# Patient Record
Sex: FEMALE | Race: WHITE | NOT HISPANIC OR LATINO
[De-identification: names, ages, dates, MRNs, and addresses within clinical notes are randomized per-mention and may not be internally consistent; named-entity substitution may affect disease eponyms.]

---

## 2017-07-12 ENCOUNTER — APPOINTMENT (OUTPATIENT)
Dept: ORTHOPEDIC SURGERY | Facility: CLINIC | Age: 52
End: 2017-07-12

## 2017-07-12 VITALS
BODY MASS INDEX: 27.6 KG/M2 | SYSTOLIC BLOOD PRESSURE: 154 MMHG | DIASTOLIC BLOOD PRESSURE: 89 MMHG | WEIGHT: 150 LBS | HEART RATE: 85 BPM | HEIGHT: 62 IN

## 2017-07-12 RX ORDER — AMOXICILLIN AND CLAVULANATE POTASSIUM 875; 125 MG/1; MG/1
875-125 TABLET, COATED ORAL
Qty: 14 | Refills: 0 | Status: ACTIVE | COMMUNITY
Start: 2017-02-08

## 2017-07-12 RX ORDER — BLOOD SUGAR DIAGNOSTIC
STRIP MISCELLANEOUS
Qty: 100 | Refills: 0 | Status: ACTIVE | COMMUNITY
Start: 2016-12-16

## 2017-07-12 RX ORDER — INSULIN GLARGINE 100 [IU]/ML
100 INJECTION, SOLUTION SUBCUTANEOUS
Qty: 15 | Refills: 0 | Status: ACTIVE | COMMUNITY
Start: 2016-09-20

## 2017-07-12 RX ORDER — LANCETS 33 GAUGE
EACH MISCELLANEOUS
Qty: 100 | Refills: 0 | Status: ACTIVE | COMMUNITY
Start: 2017-02-02

## 2017-07-12 RX ORDER — PEN NEEDLE, DIABETIC 29 G X1/2"
31G X 5 MM NEEDLE, DISPOSABLE MISCELLANEOUS
Qty: 100 | Refills: 0 | Status: ACTIVE | COMMUNITY
Start: 2016-09-21

## 2017-07-19 ENCOUNTER — OUTPATIENT (OUTPATIENT)
Dept: OUTPATIENT SERVICES | Facility: HOSPITAL | Age: 52
LOS: 1 days | End: 2017-07-19
Payer: COMMERCIAL

## 2017-07-19 VITALS
TEMPERATURE: 98 F | HEIGHT: 61 IN | HEART RATE: 82 BPM | DIASTOLIC BLOOD PRESSURE: 86 MMHG | SYSTOLIC BLOOD PRESSURE: 119 MMHG | WEIGHT: 163.14 LBS | RESPIRATION RATE: 14 BRPM | OXYGEN SATURATION: 99 %

## 2017-07-19 DIAGNOSIS — Z98.891 HISTORY OF UTERINE SCAR FROM PREVIOUS SURGERY: Chronic | ICD-10-CM

## 2017-07-19 DIAGNOSIS — M89.9 DISORDER OF BONE, UNSPECIFIED: Chronic | ICD-10-CM

## 2017-07-19 DIAGNOSIS — Z01.818 ENCOUNTER FOR OTHER PREPROCEDURAL EXAMINATION: ICD-10-CM

## 2017-07-19 DIAGNOSIS — E11.9 TYPE 2 DIABETES MELLITUS WITHOUT COMPLICATIONS: ICD-10-CM

## 2017-07-19 DIAGNOSIS — Z90.89 ACQUIRED ABSENCE OF OTHER ORGANS: Chronic | ICD-10-CM

## 2017-07-19 DIAGNOSIS — N64.9 DISORDER OF BREAST, UNSPECIFIED: Chronic | ICD-10-CM

## 2017-07-19 DIAGNOSIS — M65.312 TRIGGER THUMB, LEFT THUMB: ICD-10-CM

## 2017-07-19 DIAGNOSIS — Z98.890 OTHER SPECIFIED POSTPROCEDURAL STATES: Chronic | ICD-10-CM

## 2017-07-19 LAB
HBA1C BLD-MCNC: 7.9 % — HIGH (ref 4–5.6)
HCT VFR BLD CALC: 38.9 % — SIGNIFICANT CHANGE UP (ref 34.5–45)
HGB BLD-MCNC: 12.6 G/DL — SIGNIFICANT CHANGE UP (ref 11.5–15.5)
MCHC RBC-ENTMCNC: 29.9 PG — SIGNIFICANT CHANGE UP (ref 27–34)
MCHC RBC-ENTMCNC: 32.4 GM/DL — SIGNIFICANT CHANGE UP (ref 32–36)
MCV RBC AUTO: 92.2 FL — SIGNIFICANT CHANGE UP (ref 80–100)
PLATELET # BLD AUTO: 356 K/UL — SIGNIFICANT CHANGE UP (ref 150–400)
RBC # BLD: 4.22 M/UL — SIGNIFICANT CHANGE UP (ref 3.8–5.2)
RBC # FLD: 13.1 % — SIGNIFICANT CHANGE UP (ref 10.3–14.5)
WBC # BLD: 10.79 K/UL — HIGH (ref 3.8–10.5)
WBC # FLD AUTO: 10.79 K/UL — HIGH (ref 3.8–10.5)

## 2017-07-19 PROCEDURE — 85027 COMPLETE CBC AUTOMATED: CPT

## 2017-07-19 PROCEDURE — G0463: CPT

## 2017-07-19 PROCEDURE — 80048 BASIC METABOLIC PNL TOTAL CA: CPT

## 2017-07-19 PROCEDURE — 83036 HEMOGLOBIN GLYCOSYLATED A1C: CPT

## 2017-07-19 RX ORDER — CELECOXIB 200 MG/1
200 CAPSULE ORAL ONCE
Qty: 0 | Refills: 0 | Status: COMPLETED | OUTPATIENT
Start: 2017-07-24 | End: 2017-07-24

## 2017-07-19 RX ORDER — SODIUM CHLORIDE 9 MG/ML
3 INJECTION INTRAMUSCULAR; INTRAVENOUS; SUBCUTANEOUS EVERY 8 HOURS
Qty: 0 | Refills: 0 | Status: DISCONTINUED | OUTPATIENT
Start: 2017-07-24 | End: 2017-08-08

## 2017-07-19 RX ORDER — LIDOCAINE HCL 20 MG/ML
0.2 VIAL (ML) INJECTION ONCE
Qty: 0 | Refills: 0 | Status: DISCONTINUED | OUTPATIENT
Start: 2017-07-24 | End: 2017-08-08

## 2017-07-19 RX ORDER — ACETAMINOPHEN 500 MG
975 TABLET ORAL ONCE
Qty: 0 | Refills: 0 | Status: COMPLETED | OUTPATIENT
Start: 2017-07-24 | End: 2017-07-24

## 2017-07-19 NOTE — H&P PST ADULT - PROBLEM SELECTOR PLAN 2
hold metformin on DOS  pt was instructed by PMD to hold lantus 50% the night prior to surgery.  check finger stick DOS

## 2017-07-19 NOTE — H&P PST ADULT - PSH
Breast lesion  s/p right breast lumpectomy, benign  H/O foot surgery    History of tonsillectomy    S/P  section  x2  Skull lesion  s/p excision of cyst on skull - benign

## 2017-07-19 NOTE — H&P PST ADULT - MUSCULOSKELETAL
details… detailed exam left thumb with decreased ROM, 50 degrees flexion and extension at interphalangeal joint (PIP)

## 2017-07-19 NOTE — H&P PST ADULT - PMH
Dyslipidemia    Gestational diabetes    Preeclampsia    Type 2 diabetes mellitus Dyslipidemia    Gestational diabetes    Hypertension    Preeclampsia    Trigger thumb, left thumb    Type 2 diabetes mellitus

## 2017-07-19 NOTE — H&P PST ADULT - NSANTHOSAYNRD_GEN_A_CORE
No. SUSU screening performed.  STOP BANG Legend: 0-2 = LOW Risk; 3-4 = INTERMEDIATE Risk; 5-8 = HIGH Risk

## 2017-07-19 NOTE — H&P PST ADULT - HISTORY OF PRESENT ILLNESS
53 yo female. h/o T2DM (FS='s), HTN, HLD. c/o left thumb pain, locking and decreased ROM x months, evaluated by ortho, s/p JOEL and NSAIDS treatments, Dr. Hernández, now presents to PST scheduled for trigger thumb release.

## 2017-07-19 NOTE — H&P PST ADULT - ATTENDING COMMENTS
The patient has trigger finger left thumb which is locked in mid flexion/extension. Patient is markedly limited by the pain and restricted movement. Additional cortisone injection is unlikely to provide lasting relief. Therefore left trigger release surgery is indicated.    Surgery for left trigger finger is indicated because of symptoms refractory to conservative treatment. The patient has pain and interference with activities of daily living. While the patient may see improvement, the patient may not have complete range of motion or complete return to activities of daily living. Postoperative hand therapy may be required. The range of treatment alternatives, and the associated risks complications limitations and expectations were explained and discussed. All questions have been answered. The patient has expressed acceptance and understanding of the above and has consented to surgery.

## 2017-07-20 LAB
ANION GAP SERPL CALC-SCNC: 20 MMOL/L — HIGH (ref 5–17)
BUN SERPL-MCNC: 19 MG/DL — SIGNIFICANT CHANGE UP (ref 7–23)
CALCIUM SERPL-MCNC: 9.6 MG/DL — SIGNIFICANT CHANGE UP (ref 8.4–10.5)
CHLORIDE SERPL-SCNC: 102 MMOL/L — SIGNIFICANT CHANGE UP (ref 96–108)
CO2 SERPL-SCNC: 21 MMOL/L — LOW (ref 22–31)
CREAT SERPL-MCNC: 0.72 MG/DL — SIGNIFICANT CHANGE UP (ref 0.5–1.3)
GLUCOSE SERPL-MCNC: 143 MG/DL — HIGH (ref 70–99)
POTASSIUM SERPL-MCNC: 4.6 MMOL/L — SIGNIFICANT CHANGE UP (ref 3.5–5.3)
POTASSIUM SERPL-SCNC: 4.6 MMOL/L — SIGNIFICANT CHANGE UP (ref 3.5–5.3)
SODIUM SERPL-SCNC: 143 MMOL/L — SIGNIFICANT CHANGE UP (ref 135–145)

## 2017-07-24 ENCOUNTER — TRANSCRIPTION ENCOUNTER (OUTPATIENT)
Age: 52
End: 2017-07-24

## 2017-07-24 ENCOUNTER — OUTPATIENT (OUTPATIENT)
Dept: OUTPATIENT SERVICES | Facility: HOSPITAL | Age: 52
LOS: 1 days | End: 2017-07-24
Payer: COMMERCIAL

## 2017-07-24 ENCOUNTER — APPOINTMENT (OUTPATIENT)
Dept: ORTHOPEDIC SURGERY | Facility: HOSPITAL | Age: 52
End: 2017-07-24

## 2017-07-24 VITALS
DIASTOLIC BLOOD PRESSURE: 86 MMHG | WEIGHT: 163.14 LBS | HEART RATE: 82 BPM | OXYGEN SATURATION: 99 % | HEIGHT: 61 IN | SYSTOLIC BLOOD PRESSURE: 120 MMHG | TEMPERATURE: 99 F | RESPIRATION RATE: 18 BRPM

## 2017-07-24 VITALS
RESPIRATION RATE: 18 BRPM | SYSTOLIC BLOOD PRESSURE: 120 MMHG | DIASTOLIC BLOOD PRESSURE: 84 MMHG | OXYGEN SATURATION: 100 % | TEMPERATURE: 98 F | HEART RATE: 64 BPM

## 2017-07-24 DIAGNOSIS — Z98.890 OTHER SPECIFIED POSTPROCEDURAL STATES: Chronic | ICD-10-CM

## 2017-07-24 DIAGNOSIS — Z98.891 HISTORY OF UTERINE SCAR FROM PREVIOUS SURGERY: Chronic | ICD-10-CM

## 2017-07-24 DIAGNOSIS — N64.9 DISORDER OF BREAST, UNSPECIFIED: Chronic | ICD-10-CM

## 2017-07-24 DIAGNOSIS — M65.312 TRIGGER THUMB, LEFT THUMB: ICD-10-CM

## 2017-07-24 DIAGNOSIS — M89.9 DISORDER OF BONE, UNSPECIFIED: Chronic | ICD-10-CM

## 2017-07-24 DIAGNOSIS — Z90.89 ACQUIRED ABSENCE OF OTHER ORGANS: Chronic | ICD-10-CM

## 2017-07-24 PROCEDURE — 26055 INCISE FINGER TENDON SHEATH: CPT | Mod: FA

## 2017-07-24 RX ORDER — SIMVASTATIN 20 MG/1
1 TABLET, FILM COATED ORAL
Qty: 0 | Refills: 0 | COMMUNITY

## 2017-07-24 RX ORDER — METFORMIN HYDROCHLORIDE 850 MG/1
1 TABLET ORAL
Qty: 0 | Refills: 0 | COMMUNITY

## 2017-07-24 RX ORDER — CELECOXIB 200 MG/1
200 CAPSULE ORAL ONCE
Qty: 0 | Refills: 0 | Status: DISCONTINUED | OUTPATIENT
Start: 2017-07-24 | End: 2017-08-08

## 2017-07-24 RX ORDER — ONDANSETRON 8 MG/1
4 TABLET, FILM COATED ORAL ONCE
Qty: 0 | Refills: 0 | Status: DISCONTINUED | OUTPATIENT
Start: 2017-07-24 | End: 2017-08-08

## 2017-07-24 RX ORDER — OXYCODONE HYDROCHLORIDE 5 MG/1
5 TABLET ORAL ONCE
Qty: 0 | Refills: 0 | Status: DISCONTINUED | OUTPATIENT
Start: 2017-07-24 | End: 2017-07-24

## 2017-07-24 RX ORDER — LOSARTAN POTASSIUM 100 MG/1
1 TABLET, FILM COATED ORAL
Qty: 0 | Refills: 0 | COMMUNITY

## 2017-07-24 RX ORDER — SODIUM CHLORIDE 9 MG/ML
1000 INJECTION, SOLUTION INTRAVENOUS
Qty: 0 | Refills: 0 | Status: DISCONTINUED | OUTPATIENT
Start: 2017-07-24 | End: 2017-08-08

## 2017-07-24 RX ORDER — INSULIN GLARGINE 100 [IU]/ML
34 INJECTION, SOLUTION SUBCUTANEOUS
Qty: 0 | Refills: 0 | COMMUNITY

## 2017-07-24 RX ADMIN — CELECOXIB 200 MILLIGRAM(S): 200 CAPSULE ORAL at 09:50

## 2017-07-24 RX ADMIN — Medication 975 MILLIGRAM(S): at 09:50

## 2017-07-24 NOTE — ASU DISCHARGE PLAN (ADULT/PEDIATRIC). - MEDICATION SUMMARY - MEDICATIONS TO STOP TAKING
I will STOP taking the medications listed below when I get home from the hospital:    famotidine 20 mg oral tablet x2 preop  --

## 2017-07-24 NOTE — PRE-ANESTHESIA EVALUATION ADULT - NSANTHPMHFT_GEN_ALL_CORE
complication with epidural requiring emergent C/S  palpitations with anxiety - pt had cardiac work up including stress test several years ago which demonstrated no abnormal findings per patient  BG this am 140

## 2017-07-24 NOTE — ASU DISCHARGE PLAN (ADULT/PEDIATRIC). - NOTIFY
Numbness, color, or temperature change to extremity/Bleeding that does not stop/Fever greater than 101/Swelling that continues/Pain not relieved by Medications

## 2017-07-24 NOTE — ASU DISCHARGE PLAN (ADULT/PEDIATRIC). - MEDICATION SUMMARY - MEDICATIONS TO TAKE
I will START or STAY ON the medications listed below when I get home from the hospital:    famotidine 20 mg oral tablet x2 preop  --     -- Indication: For Trigger finger of left thumb    Percocet 5/325 oral tablet  -- 1-2 tab(s) by mouth every 4 hours, As Needed MDD:10 tabs  -- Caution federal law prohibits the transfer of this drug to any person other  than the person for whom it was prescribed.  May cause drowsiness.  Alcohol may intensify this effect.  Use care when operating dangerous machinery.  This prescription cannot be refilled.  This product contains acetaminophen.  Do not use  with any other product containing acetaminophen to prevent possible liver damage.  Using more of this medication than prescribed may cause serious breathing problems.    -- Indication: For pain    losartan 50 mg oral tablet  -- 1 tab(s) by mouth once a day  -- Indication: For home med    Lantus 100 units/mL subcutaneous solution  -- 34 unit(s) subcutaneous once a day (at bedtime)  -- Indication: For home med    metFORMIN 1000 mg oral tablet, extended release  -- 1 tab(s) by mouth 2 times a day  -- Indication: For home med    simvastatin 5 mg oral tablet  -- 1 tab(s) by mouth once a day (at bedtime)  -- Indication: For home med

## 2017-07-24 NOTE — ASU PATIENT PROFILE, ADULT - PMH
Dyslipidemia    Gestational diabetes    Hypertension    Preeclampsia    Trigger thumb, left thumb    Type 2 diabetes mellitus

## 2017-07-24 NOTE — ASU PATIENT PROFILE, ADULT - VISION (WITH CORRECTIVE LENSES IF THE PATIENT USUALLY WEARS THEM):
Partially impaired: cannot see medication labels or newsprint, but can see obstacles in path, and the surrounding layout; can count fingers at arm's length/corrective

## 2017-07-24 NOTE — BRIEF OPERATIVE NOTE - OPERATION/FINDINGS
preop dx: L thumb triggering  postop dx: same, hourglass FPL tendon. intraoperative exam demonstrated free motion of FPL tendon  procedure: L tumb trigger release

## 2017-07-24 NOTE — ASU DISCHARGE PLAN (ADULT/PEDIATRIC). - SPECIAL INSTRUCTIONS
see instruction sheet Keep dressing clean and dry  Leave steri-strips intact until follow up  Finger range of motion exercises  Follow up with Dr. Hernández in 7-10 days. Call [phone] for appointment. Keep dressing clean and dry  Avoid weight bearing greater than 2-3 lbs with affected hand  Follow up with Dr. Hernández in 7-10 days. Call [phone] for appointment.

## 2017-07-31 ENCOUNTER — APPOINTMENT (OUTPATIENT)
Dept: ORTHOPEDIC SURGERY | Facility: CLINIC | Age: 52
End: 2017-07-31
Payer: COMMERCIAL

## 2017-07-31 DIAGNOSIS — M65.312 TRIGGER THUMB, LEFT THUMB: ICD-10-CM

## 2017-07-31 PROCEDURE — 99024 POSTOP FOLLOW-UP VISIT: CPT

## 2019-04-18 NOTE — ASU PATIENT PROFILE, ADULT - PSH
PCO  OS (Posterior Capsule Opacification)   PCO is visually significant and impairment of vision does not meet the patient’s functional needs or interferes with activities of daily living. Risks benefits and alternatives to the Nd:YAG Laser reviewed including elevated IOP immediately postop and retinal tear/detachment. Patient to notify their ophthalmologist promptly if they have a significant change in symptoms such as flashes of light (photopsia) an increase in floaters loss of visual field or decrease in visual acuity after the procedure. Patient will be scheduled in [place] [id number] for Nd:YAG Laser. Breast lesion  s/p right breast lumpectomy, benign  H/O foot surgery    History of tonsillectomy    S/P  section  x2  Skull lesion  s/p excision of cyst on skull - benign

## 2019-08-19 ENCOUNTER — RESULT REVIEW (OUTPATIENT)
Age: 54
End: 2019-08-19

## 2019-08-22 PROBLEM — I10 ESSENTIAL (PRIMARY) HYPERTENSION: Chronic | Status: ACTIVE | Noted: 2017-07-19

## 2019-08-22 PROBLEM — E11.9 TYPE 2 DIABETES MELLITUS WITHOUT COMPLICATIONS: Chronic | Status: ACTIVE | Noted: 2017-07-19

## 2019-08-22 PROBLEM — O14.90 UNSPECIFIED PRE-ECLAMPSIA, UNSPECIFIED TRIMESTER: Chronic | Status: ACTIVE | Noted: 2017-07-19

## 2019-08-22 PROBLEM — O24.419 GESTATIONAL DIABETES MELLITUS IN PREGNANCY, UNSPECIFIED CONTROL: Chronic | Status: ACTIVE | Noted: 2017-07-19

## 2019-08-22 PROBLEM — E78.5 HYPERLIPIDEMIA, UNSPECIFIED: Chronic | Status: ACTIVE | Noted: 2017-07-19

## 2019-08-22 PROBLEM — M65.312 TRIGGER THUMB, LEFT THUMB: Chronic | Status: ACTIVE | Noted: 2017-07-19

## 2019-08-30 ENCOUNTER — APPOINTMENT (OUTPATIENT)
Dept: ORTHOPEDIC SURGERY | Facility: CLINIC | Age: 54
End: 2019-08-30
Payer: COMMERCIAL

## 2019-08-30 VITALS
HEIGHT: 62 IN | HEART RATE: 85 BPM | SYSTOLIC BLOOD PRESSURE: 136 MMHG | DIASTOLIC BLOOD PRESSURE: 82 MMHG | BODY MASS INDEX: 30.36 KG/M2 | WEIGHT: 165 LBS

## 2019-08-30 DIAGNOSIS — M22.2X1 PATELLOFEMORAL DISORDERS, RIGHT KNEE: ICD-10-CM

## 2019-08-30 PROCEDURE — 73562 X-RAY EXAM OF KNEE 3: CPT | Mod: RT

## 2019-08-30 PROCEDURE — 99214 OFFICE O/P EST MOD 30 MIN: CPT

## 2019-08-30 NOTE — PHYSICAL EXAM
[de-identified] : Constitutional: Well-nourished, well-developed, No acute distress\par Respiratory:  Good respiratory effort, no SOB\par Lymphatic: No regional lymphadenopathy, no lymphedema\par Psychiatric: Pleasant and normal affect, alert and oriented x3\par Skin: Clean dry and intact B/L UE/LE\par Musculoskeletal: normal except where as noted in regional exam\par \par \par Left Knee:\par APPEARANCE: no marked deformities, no swelling or malalignment\par POSITIVE TENDERNESS:  none\par NONTENDER: jt lines b/l & retinacula b/l, patellar & quadriceps tendons, MCL/LCL, ITB at the lateral femoral condyle & Gerdy's tubercle, pes bursa. \par ROM: full & painless. \par RESISTIVE TESTING: painless resisted knee flex/ext. \par SPECIAL TESTS: stable v/v stress. painless grind. neg Lachman's. neg ant/post drawer. neg Gil's. neg Thessaly test. neg Maxi's & Malacrae's\par NEURO: Normal sensation of LE, DTRs 2+/4 patella and achilles\par PULSES: 2+ DP/PT pulses\par B/L Hips: No asymmetry, malalignment, or swelling, Full ROM, 5/5 strength in flexion/ext, IR/ER, Abd/Add, Joints stable\par B/L Ankles: No asymmetry, malalignment, or swelling, Full ROM, 5/5 strength in DF/PF/Inv/Ev, Joints stable\par BIOMECHANICAL EXAM: no marked leg length discrepancy, moderate hip abductor weakness b/l, no marked foot pronation, tight hams and ITB b/l.  Normal gait and station\par \par Right Knee:\par APPEARANCE: no marked deformities, no swelling or malalignment\par POSITIVE TENDERNESS:  + Distal IT band and semitendinosis, crepitus of the anterior knee, and tenderness of patellar retinaculum\par NONTENDER: jt lines b/l, patellar & quadriceps tendons, MCL/LCL, ITB at the lateral femoral condyle & Gerdy's tubercle, pes bursa. \par ROM: full & painless, although some discomfort in deep knee flexion\par RESISTIVE TESTING: + discomfort with knee ext from deep knee flexion (stretched position), painless knee flexion. \par SPECIAL TESTS: stable v/v stress. painless grind. neg Lachman's. neg ant/post drawer. neg Gil's. neg Thessaly test. neg Maxi's & Malacrae's\par NEURO: Normal sensation of LE, DTRs 2+/4 patella and achilles\par PULSES: 2+ DP/PT pulses\par \par  [de-identified] : The following radiographs were ordered and read by me during this patient's visit. I reviewed each radiograph in detail with the patient and discussed the findings as highlighted below. \par \par 3 views of the right knee were obtained today that show degenerative changes and evidence of mild tricompartmental osteoarthritis.  No fracture, or dislocation seen at this time. There is no malalignment. No other obvious osseous abnormality. Otherwise unremarkable.\par \par

## 2019-08-30 NOTE — HISTORY OF PRESENT ILLNESS
[de-identified] : Patient is here for right knee pain that began about 3 months ago without inciting injury. She states that she had her knee in an elevated flexed position while she was sitting and when she went to stand up, she had pain and stiffness in her leg. She saw her PCP who took xrays of the knee, which we are unable to access and was told to take Aleve but she did not take any medication for this pain. She has difficulty standing after sitting for extended periods of time and she also has pain when taking stairs. Denies N/T/R/Prior injury. She has a history of diabetes.

## 2019-08-30 NOTE — DISCUSSION/SUMMARY
[de-identified] : Discussed findings of today's exam and possible causes of patient's pain.  Educated patient on their most probable diagnosis of right patellofemoral syndrome and likely distal ITB/semitendinosus inflammation.  Reviewed possible courses of treatment, and we collaboratively decided best course of treatment at this time will include conservative management.  Patient will be started on a course of physical therapy to restore normal range of motion and strength as tolerated. Patient can  over-the-counter topical anti-inflammatory as she is primarily localized lateral knee pain, she is also advised may take up over-the-counter knee compression sleeve to be worn for support. Patient works as an , the knee brace may help during this activity. May take over-the-counter NSAIDs as needed for pain.  Follow up as needed.  Patient appreciates and agrees with current plan.\par \par This note was generated using dragon medical dictation software.  A reasonable effort has been made for proofreading its contents, but typos may still remain.  If there are any questions or points of clarification needed please notify my office.

## 2023-04-05 NOTE — H&P PST ADULT - NS PRO FEM REPRO PAP RESULTS
Goal Outcome Evaluation:  [id number].    Major Shift Events:  None.  Neuro: Intact expect for L-eye visual field cut.  C/O HA and taking PRN Tylenol with relief.  Patient is also [place].  Cards: Patient is SR in 60-70s, SBP>140, received 10mg IV hydralazine with improved SBP <140.  C/O Nausea and took compazine with improvement.    Resp: On Room air, LS clear.  GI/: Patient with poor appetite, consumed a toast with jelly, she is NPO for possible angiogram.  Up with SBA to the bathroom and voids spontaneously and had X1 BM.  Skin: no known issue, c/o lower back pain and using ice packs for now.   Plan: Will continue to monitor and notify provider with status changes.  For vital signs and complete assessments, please see documentation flowsheets.    normal

## 2024-01-01 NOTE — H&P PST ADULT - VENOUS THROMBOEMBOLISM AGE
[Alert] : alert [Normocephalic] : normocephalic [Flat Open Anterior [place]] : flat open anterior fontanelle [PERRL] : PERRL [Red Reflex Bilateral] : red reflex bilateral [Normally Placed Ears] : normally placed ears [Auricles Well Formed] : auricles well formed [Clear Tympanic membranes] : clear tympanic membranes [Light reflex present] : light reflex present [Bony landmarks visible] : bony landmarks visible [Nares Patent] : nares patent [Palate Intact] : palate intact [Uvula Midline] : uvula midline [Supple, full passive range of motion] : supple, full passive range of motion [Symmetric Chest Rise] : symmetric chest rise [Clear to Auscultation Bilaterally] : clear to auscultation bilaterally [Regular Rate and Rhythm] : regular rate and rhythm [S1, S2 present] : S1, S2 present [+2 Femoral Pulses] : +2 femoral pulses [Soft] : soft [Bowel Sounds] : bowel sounds present 40-59 yrs [Normal external genitailia] : normal external genitalia [Patent Vagina] : vagina patent [Normally Placed] : normally placed [No Abnormal Lymph Nodes Palpated] : no abnormal lymph nodes palpated [Symmetric Flexed Extremities] : symmetric flexed extremities [Startle Reflex] : startle reflex present [Suck Reflex] : suck reflex present [Rooting] : rooting reflex present [Palmar Grasp] : palmar grasp reflex present [Plantar Grasp] : plantar grasp reflex present [Symmetric [place]] : symmetric [place] [Acute Distress] : no acute distress [Discharge] : no discharge [Palpable Masses] : no palpable masses [Murmurs] : no murmurs [Tender] : nontender [Distended] : not distended [Hepatomegaly] : no hepatomegaly [Splenomegaly] : no splenomegaly [Clitoromegaly] : no clitoromegaly [Edmond-Ortolani] : negative Edmond-Ortolani [Spinal Dimple] : no spinal dimple [Tuft of Hair] : no tuft of hair [Jaundice] : no jaundice [Rash and/or lesion present] : no rash/lesion

## 2024-11-29 NOTE — H&P PST ADULT - ENDOCRINE
-stable  -Continue Trulicity 0.75mg SC weekly  -continue gabapentin for neuropathy  -Limit carbohydrates to 45 grams with meals and 15 grams with snacks  -monitor blood sugars  -goal for blood sugar fasting or pre-meal  is   -goal for blood sugar 2 hours after a meal is less than 180  -goal for blood sugar at bedtime is less than 150  -Regular aerobic exercise  -referral to diabetes education      negative

## 2025-08-31 ENCOUNTER — EMERGENCY (EMERGENCY)
Facility: HOSPITAL | Age: 60
LOS: 1 days | End: 2025-08-31
Attending: STUDENT IN AN ORGANIZED HEALTH CARE EDUCATION/TRAINING PROGRAM | Admitting: STUDENT IN AN ORGANIZED HEALTH CARE EDUCATION/TRAINING PROGRAM
Payer: COMMERCIAL

## 2025-08-31 VITALS
WEIGHT: 115.08 LBS | TEMPERATURE: 98 F | DIASTOLIC BLOOD PRESSURE: 80 MMHG | SYSTOLIC BLOOD PRESSURE: 140 MMHG | OXYGEN SATURATION: 99 % | HEART RATE: 88 BPM | RESPIRATION RATE: 18 BRPM | HEIGHT: 62 IN

## 2025-08-31 DIAGNOSIS — Z98.891 HISTORY OF UTERINE SCAR FROM PREVIOUS SURGERY: Chronic | ICD-10-CM

## 2025-08-31 DIAGNOSIS — Z90.89 ACQUIRED ABSENCE OF OTHER ORGANS: Chronic | ICD-10-CM

## 2025-08-31 DIAGNOSIS — N64.9 DISORDER OF BREAST, UNSPECIFIED: Chronic | ICD-10-CM

## 2025-08-31 DIAGNOSIS — M89.9 DISORDER OF BONE, UNSPECIFIED: Chronic | ICD-10-CM

## 2025-08-31 DIAGNOSIS — Z98.890 OTHER SPECIFIED POSTPROCEDURAL STATES: Chronic | ICD-10-CM

## 2025-08-31 PROCEDURE — 99284 EMERGENCY DEPT VISIT MOD MDM: CPT

## 2025-08-31 RX ORDER — ACETAMINOPHEN 500 MG/5ML
975 LIQUID (ML) ORAL ONCE
Refills: 0 | Status: COMPLETED | OUTPATIENT
Start: 2025-08-31 | End: 2025-08-31

## 2025-08-31 RX ADMIN — Medication 975 MILLIGRAM(S): at 12:45
